# Patient Record
Sex: MALE | Race: BLACK OR AFRICAN AMERICAN
[De-identification: names, ages, dates, MRNs, and addresses within clinical notes are randomized per-mention and may not be internally consistent; named-entity substitution may affect disease eponyms.]

---

## 2018-06-22 ENCOUNTER — HOSPITAL ENCOUNTER (OUTPATIENT)
Dept: HOSPITAL 74 - JASU-SURG | Age: 63
Discharge: HOME | End: 2018-06-22
Attending: UROLOGY
Payer: COMMERCIAL

## 2018-06-22 VITALS — HEART RATE: 78 BPM | TEMPERATURE: 98.4 F | DIASTOLIC BLOOD PRESSURE: 55 MMHG | SYSTOLIC BLOOD PRESSURE: 87 MMHG

## 2018-06-22 VITALS — BODY MASS INDEX: 20.6 KG/M2

## 2018-06-22 DIAGNOSIS — Z53.8: Primary | ICD-10-CM

## 2018-06-22 LAB
APTT BLD: 27.1 SECONDS (ref 25.2–36.5)
INR BLD: 1.45 (ref 0.82–1.09)
PT PNL PPP: 16.4 SEC (ref 9.7–13)

## 2018-06-22 PROCEDURE — 0TJB8ZZ INSPECTION OF BLADDER, VIA NATURAL OR ARTIFICIAL OPENING ENDOSCOPIC: ICD-10-PCS | Performed by: UROLOGY

## 2018-08-17 ENCOUNTER — HOSPITAL ENCOUNTER (OUTPATIENT)
Dept: HOSPITAL 74 - JASU-SURG | Age: 63
LOS: 1 days | Discharge: HOME | End: 2018-08-18
Attending: UROLOGY
Payer: COMMERCIAL

## 2018-08-17 VITALS — BODY MASS INDEX: 20.6 KG/M2

## 2018-08-17 DIAGNOSIS — C61: Primary | ICD-10-CM

## 2018-08-17 LAB
ALBUMIN SERPL-MCNC: 2 G/DL (ref 3.4–5)
ALP SERPL-CCNC: 93 U/L (ref 45–117)
ALT SERPL-CCNC: 14 U/L (ref 12–78)
ANION GAP SERPL CALC-SCNC: 5 MMOL/L (ref 8–16)
AST SERPL-CCNC: 11 U/L (ref 15–37)
BASOPHILS # BLD: 0.8 % (ref 0–2)
BILIRUB SERPL-MCNC: 0.2 MG/DL (ref 0.2–1)
BUN SERPL-MCNC: 14 MG/DL (ref 7–18)
CALCIUM SERPL-MCNC: 8.7 MG/DL (ref 8.5–10.1)
CHLORIDE SERPL-SCNC: 105 MMOL/L (ref 98–107)
CO2 SERPL-SCNC: 30 MMOL/L (ref 21–32)
CREAT SERPL-MCNC: 0.8 MG/DL (ref 0.7–1.3)
DEPRECATED RDW RBC AUTO: 19.9 % (ref 11.9–15.9)
EOSINOPHIL # BLD: 1.2 % (ref 0–4.5)
GLUCOSE SERPL-MCNC: 72 MG/DL (ref 74–106)
HCT VFR BLD CALC: 24 % (ref 35.4–49)
HGB BLD-MCNC: 7.9 GM/DL (ref 11.7–16.9)
INR BLD: 1.35 (ref 0.83–1.09)
LYMPHOCYTES # BLD: 16 % (ref 8–40)
MCH RBC QN AUTO: 30.6 PG (ref 25.7–33.7)
MCHC RBC AUTO-ENTMCNC: 33 G/DL (ref 32–35.9)
MCV RBC: 92.8 FL (ref 80–96)
MONOCYTES # BLD AUTO: 10.6 % (ref 3.8–10.2)
NEUTROPHILS # BLD: 71.4 % (ref 42.8–82.8)
PLATELET # BLD AUTO: 275 K/MM3 (ref 134–434)
PMV BLD: 7 FL (ref 7.5–11.1)
POTASSIUM SERPLBLD-SCNC: 4 MMOL/L (ref 3.5–5.1)
PROT SERPL-MCNC: 7.4 G/DL (ref 6.4–8.2)
PT PNL PPP: 15.2 SEC (ref 9.7–13)
RBC # BLD AUTO: 2.59 M/MM3 (ref 4–5.6)
SODIUM SERPL-SCNC: 140 MMOL/L (ref 136–145)
WBC # BLD AUTO: 4.2 K/MM3 (ref 4–10)

## 2018-08-17 PROCEDURE — 0V503ZZ DESTRUCTION OF PROSTATE, PERCUTANEOUS APPROACH: ICD-10-PCS | Performed by: UROLOGY

## 2018-08-17 PROCEDURE — 55873 CRYOABLATE PROSTATE: CPT

## 2018-08-17 NOTE — OP
Operative Note





- Note:


Operative Date: 08/17/18


Pre-Operative Diagnosis: prostate cancer


Operation: prostate cryoablation and cystoscopy


Post-Operative Diagnosis: Same as Pre-op


Surgeon: Omega Davis


Anesthesiologist/CRNA: Linda Perales


Anesthesia: General


Drains & Tubes with Location: 18 fr esteban


Operative Report Dictated: Yes

## 2018-08-18 VITALS — TEMPERATURE: 99.7 F | SYSTOLIC BLOOD PRESSURE: 110 MMHG | DIASTOLIC BLOOD PRESSURE: 68 MMHG | HEART RATE: 105 BPM

## 2018-08-18 NOTE — OP
DATE OF OPERATION:  08/17/2018

 

PREOPERATIVE DIAGNOSIS:  Prostate cancer.

 

POSTOPERATIVE DIAGNOSIS:  Prostate cancer.

 

PROCEDURE:  Prostate cryoablation and cystoscopy.

 

SURGEON:  Omega Leong MD

 

ASSISTANT:  None.

 

ANESTHESIA:  General via laryngeal mask.

 

ANESTHESIOLOGIST:  _____

 

SPECIMEN:  None.

 

CULTURES:  None.

 

DRAINS:  An 18-[place] Vazquez catheter.

 

ESTIMATED BLOOD LOSS:  Negligible.

 

COMPLICATIONS:  None.

 

PROCEDURE:  Patient was brought in the operating room and placed on the operating

table in a supine position.  After the administration of general anesthesia via

laryngeal mask intravenous antibiotics were administered.  Patient was placed in the

dorsal lithotomy position.  Perineum and genitals were prepped and draped in the

usual sterile manner.  An 18-[place] Vazquez catheter was placed per urethra; 10 mL was

placed in the balloon.  Urine was evacuated.  The bladder was then filled with 300 mL

of sterile normal saline and clamped.  The transrectal ultrasound probe was inserted

and the planning was done for the focal cryoablation of the right side.

 

Once a plan was established the 3 cryo probes were placed in the predesignated

locations.  The measurements were taken, probe to probe, probe to urethra.  Now

longitudinally the length was set to approximately 3.6 cm on each probe.  Now the

Denonvilliers and external sphincter temperature sensors were placed.  Now Vazquez

catheter was removed.  Flexible cystoscopy was performed.  This demonstrated the

probes had not penetrated the prostatic urethra.  The bladder was entered and

thoroughly inspected.  There were no foreign bodies, tumors, stones or inflammation. 

Both ureteral orifices were in their usual location with clear efflux bilaterally. 

The scope was retroflexed and no probes were seen penetrating the bladder wall.

 

Now a superstiff guidewire was passed through the cystoscope into the bladder. 

Cystoscope was removed and urethral warmer was inserted over the superstiff guidewire

into the bladder.  The guidewire was removed and urethral warming was started.  Now 2

freeze/thaw cycles were done on the right side of the prostate with excellent

freezing of the gland.  At the end of the procedure cryo and temperature sensor

probes were removed.  Urethral warmer was left in place for an additional 5 minutes. 

Vazquez catheter of 18-[place] was replaced.  Sterile compressive dressing was placed on

the perineum.  He tolerated the procedure well, was awoken from anesthesia in the

operating room and transferred to the recovery room in stable condition.

 

 

OMEGA LEONG M.D.

 

SREEKANTH/[id number]

DD: 08/17/2018 14:06

DT: 08/18/2018 10:23

Job #:  [id number]